# Patient Record
Sex: FEMALE | URBAN - METROPOLITAN AREA
[De-identification: names, ages, dates, MRNs, and addresses within clinical notes are randomized per-mention and may not be internally consistent; named-entity substitution may affect disease eponyms.]

---

## 2020-09-25 ENCOUNTER — *OTHER (OUTPATIENT)
Dept: URBAN - METROPOLITAN AREA CLINIC 23 | Facility: CLINIC | Age: 45
Setting detail: DERMATOLOGY
End: 2020-09-25

## 2020-09-25 DIAGNOSIS — C44.1192 BASAL CELL CARCINOMA OF SKIN OF LEFT LOWER EYELID, INCLUDING CANTHUS: ICD-10-CM

## 2020-09-25 PROCEDURE — 99213 OFFICE O/P EST LOW 20 MIN: CPT

## 2020-09-25 RX ORDER — SULFACETAMIDE SODIUM, SULFUR 100; 50 MG/G; MG/G
EMULSION TOPICAL
Qty: 227 | Refills: 2
Start: 2020-09-25

## 2020-09-25 RX ORDER — TAZAROTENE 0.1 MG/G
A SMALL AMOUNT CREAM CUTANEOUS AT BEDTIME
Qty: 30 | Refills: 2
Start: 2020-09-25

## 2020-09-25 RX ORDER — SPIRONOLACTONE 50 MG/1
1 TABLET TABLET, FILM COATED ORAL AT BEDTIME
Qty: 90 | Refills: 1
Start: 2020-09-25

## 2020-09-25 RX ORDER — SPIRONOLACTONE 50 MG/1
1 TABLET TABLET, FILM COATED ORAL AT BEDTIME
Qty: 30 | Refills: 5
Start: 2020-09-25

## 2021-03-24 ENCOUNTER — *OTHER (OUTPATIENT)
Dept: URBAN - METROPOLITAN AREA CLINIC 23 | Facility: CLINIC | Age: 46
Setting detail: DERMATOLOGY
End: 2021-03-24

## 2021-03-24 DIAGNOSIS — L40.9 PSORIASIS, UNSPECIFIED: ICD-10-CM

## 2021-03-24 PROCEDURE — 99213 OFFICE O/P EST LOW 20 MIN: CPT

## 2021-03-26 ENCOUNTER — RX ONLY (RX ONLY)
Age: 46
End: 2021-03-26

## 2021-03-26 RX ORDER — SULFACETAMIDE SODIUM, SULFUR 100; 50 MG/G; MG/G
EMULSION TOPICAL
Qty: 227 | Refills: 2
Start: 2021-03-26

## 2021-10-12 ENCOUNTER — *OTHER (OUTPATIENT)
Dept: URBAN - METROPOLITAN AREA CLINIC 23 | Facility: CLINIC | Age: 46
Setting detail: DERMATOLOGY
End: 2021-10-12

## 2021-10-12 ENCOUNTER — RX ONLY (RX ONLY)
Age: 46
End: 2021-10-12

## 2021-10-12 DIAGNOSIS — L57.0 ACTINIC KERATOSIS: ICD-10-CM

## 2021-10-12 PROCEDURE — 99214 OFFICE O/P EST MOD 30 MIN: CPT

## 2022-04-12 ENCOUNTER — RX ONLY (RX ONLY)
Age: 47
End: 2022-04-12

## 2022-04-12 ENCOUNTER — *OTHER (OUTPATIENT)
Dept: URBAN - METROPOLITAN AREA CLINIC 23 | Facility: CLINIC | Age: 47
Setting detail: DERMATOLOGY
End: 2022-04-12

## 2022-04-12 DIAGNOSIS — L57.0 ACTINIC KERATOSIS: ICD-10-CM

## 2022-04-12 PROCEDURE — 17110 DESTRUCT B9 LESION 1-14: CPT

## 2022-04-12 PROCEDURE — 99214 OFFICE O/P EST MOD 30 MIN: CPT

## 2023-02-22 ENCOUNTER — RX ONLY (RX ONLY)
Age: 48
End: 2023-02-22

## 2023-02-22 RX ORDER — SPIRONOLACTONE 25 MG/1
TABLET, FILM COATED ORAL
Qty: 30 | Refills: 1 | Status: ERX | COMMUNITY
Start: 2023-02-22

## 2023-02-22 RX ORDER — SULFACETAMIDE SODIUM, SULFUR 100; 50 MG/G; MG/G
EMULSION TOPICAL
Qty: 227 | Refills: 1 | Status: ERX | COMMUNITY
Start: 2023-02-22

## 2023-02-22 RX ORDER — SPIRONOLACTONE 50 MG/1
TABLET, FILM COATED ORAL
Qty: 30 | Refills: 1 | Status: ERX | COMMUNITY
Start: 2023-02-22

## 2023-02-22 RX ORDER — TAZAROTENE 0.1 MG/G
CREAM CUTANEOUS
Qty: 30 | Refills: 1 | Status: ERX | COMMUNITY
Start: 2023-02-22

## 2023-03-20 ENCOUNTER — RX ONLY (RX ONLY)
Age: 48
End: 2023-03-20

## 2023-03-20 RX ORDER — SPIRONOLACTONE 50 MG/1
TABLET, FILM COATED ORAL
Qty: 90 | Refills: 0 | Status: ERX

## 2023-03-20 RX ORDER — SPIRONOLACTONE 25 MG/1
TABLET, FILM COATED ORAL
Qty: 90 | Refills: 0 | Status: ERX

## 2023-04-10 ENCOUNTER — APPOINTMENT (OUTPATIENT)
Dept: URBAN - METROPOLITAN AREA CLINIC 196 | Age: 48
Setting detail: DERMATOLOGY
End: 2023-04-10

## 2023-04-10 DIAGNOSIS — L70.0 ACNE VULGARIS: ICD-10-CM

## 2023-04-10 PROCEDURE — OTHER COUNSELING: OTHER

## 2023-04-10 PROCEDURE — 99214 OFFICE O/P EST MOD 30 MIN: CPT

## 2023-04-10 PROCEDURE — OTHER PRESCRIPTION: OTHER

## 2023-04-10 RX ORDER — TAZAROTENE 0.1 MG/G
CREAM CUTANEOUS
Qty: 30 | Refills: 5 | Status: ERX | COMMUNITY
Start: 2023-04-10

## 2023-04-10 RX ORDER — SULFACETAMIDE SODIUM AND SULFUR 10; 5 MG/G; MG/G
RINSE TOPICAL
Qty: 227 | Refills: 5 | Status: ERX | COMMUNITY
Start: 2023-04-10

## 2023-04-10 RX ORDER — SPIRONOLACTONE 50 MG/1
TABLET, FILM COATED ORAL
Qty: 60 | Refills: 5 | Status: ERX | COMMUNITY
Start: 2023-04-10

## 2023-04-10 NOTE — PROCEDURE: COUNSELING
Patient Specific Counseling (Will Not Stick From Patient To Patient): STILL GETS INFLAMMATROY BREAKOUTS ON CHEEKS AND CHIN.\\nCHRONIC AND NOT AT TREATMENT GOAL.\\n\\nCONT to wash with Sodium Sulfacetamide Sulfer 10/5%\\ncleanser up to BID.\\n\\nCONT to apply a pea-sized amount of Tazorac 0.1% cream\\nto face QHS with a moisturizer on top.\\n\\nINCREASE TO spironolactone 50 mg tablet	Take 1 tablet twice daily. Avoid foods with high potassium.\\n\\nSPIRONOLACTONE gets sent to [facility].\\nTopicals get to sent to [facility].\\n\\nBP today is 110/70 RS Patient Specific Counseling (Will Not Stick From Patient To Patient): STILL GETS INFLAMMATROY BREAKOUTS ON CHEEKS AND CHIN.\\nCHRONIC AND NOT AT TREATMENT GOAL.\\n\\nCONT to wash with Sodium Sulfacetamide Sulfer 10/5%\\ncleanser up to BID.\\n\\nCONT to apply a pea-sized amount of Tazorac 0.1% cream\\nto face QHS with a moisturizer on top.\\n\\nINCREASE TO spironolactone 50 mg tablet	Take 1 tablet twice daily. Avoid foods with high potassium.\\n\\nSPIRONOLACTONE gets sent to [facility].\\nTopicals get to sent to [facility].\\n\\nBP today is 110/70 RS